# Patient Record
Sex: MALE | Race: WHITE | NOT HISPANIC OR LATINO | Employment: FULL TIME | URBAN - METROPOLITAN AREA
[De-identification: names, ages, dates, MRNs, and addresses within clinical notes are randomized per-mention and may not be internally consistent; named-entity substitution may affect disease eponyms.]

---

## 2022-01-03 ENCOUNTER — HOSPITAL ENCOUNTER (OUTPATIENT)
Facility: HOSPITAL | Age: 67
Setting detail: OBSERVATION
Discharge: HOME | End: 2022-01-04
Attending: EMERGENCY MEDICINE | Admitting: INTERNAL MEDICINE

## 2022-01-03 ENCOUNTER — APPOINTMENT (OUTPATIENT)
Dept: CT IMAGING | Facility: HOSPITAL | Age: 67
End: 2022-01-03

## 2022-01-03 DIAGNOSIS — R06.09 DYSPNEA ON EXERTION: ICD-10-CM

## 2022-01-03 DIAGNOSIS — R00.0 TACHYCARDIA: ICD-10-CM

## 2022-01-03 DIAGNOSIS — R06.02 SHORTNESS OF BREATH: ICD-10-CM

## 2022-01-03 DIAGNOSIS — R55 NEAR SYNCOPE: ICD-10-CM

## 2022-01-03 DIAGNOSIS — I95.9 HYPOTENSION, UNSPECIFIED HYPOTENSION TYPE: Primary | ICD-10-CM

## 2022-01-03 LAB
ALBUMIN SERPL-MCNC: 4.8 G/DL (ref 3.5–5.2)
ALBUMIN/GLOB SERPL: 1.7 G/DL
ALP SERPL-CCNC: 97 U/L (ref 39–117)
ALT SERPL W P-5'-P-CCNC: 17 U/L (ref 1–41)
ANION GAP SERPL CALCULATED.3IONS-SCNC: 14 MMOL/L (ref 5–15)
AST SERPL-CCNC: 22 U/L (ref 1–40)
BASOPHILS # BLD AUTO: 0.05 10*3/MM3 (ref 0–0.2)
BASOPHILS NFR BLD AUTO: 0.7 % (ref 0–1.5)
BILIRUB SERPL-MCNC: 0.9 MG/DL (ref 0–1.2)
BILIRUB UR QL STRIP: NEGATIVE
BUN SERPL-MCNC: 19 MG/DL (ref 8–23)
BUN/CREAT SERPL: 17.9 (ref 7–25)
CALCIUM SPEC-SCNC: 10.1 MG/DL (ref 8.6–10.5)
CHLORIDE SERPL-SCNC: 95 MMOL/L (ref 98–107)
CLARITY UR: CLEAR
CO2 SERPL-SCNC: 23 MMOL/L (ref 22–29)
COLOR UR: YELLOW
CREAT SERPL-MCNC: 1.06 MG/DL (ref 0.76–1.27)
DEPRECATED RDW RBC AUTO: 41.3 FL (ref 37–54)
EOSINOPHIL # BLD AUTO: 0.04 10*3/MM3 (ref 0–0.4)
EOSINOPHIL NFR BLD AUTO: 0.6 % (ref 0.3–6.2)
ERYTHROCYTE [DISTWIDTH] IN BLOOD BY AUTOMATED COUNT: 12.5 % (ref 12.3–15.4)
FLUAV SUBTYP SPEC NAA+PROBE: NOT DETECTED
FLUBV RNA ISLT QL NAA+PROBE: NOT DETECTED
GFR SERPL CREATININE-BSD FRML MDRD: 70 ML/MIN/1.73
GFR SERPL CREATININE-BSD FRML MDRD: 85 ML/MIN/1.73
GLOBULIN UR ELPH-MCNC: 2.8 GM/DL
GLUCOSE SERPL-MCNC: 90 MG/DL (ref 65–99)
GLUCOSE UR STRIP-MCNC: NEGATIVE MG/DL
HCT VFR BLD AUTO: 43.1 % (ref 37.5–51)
HGB BLD-MCNC: 15 G/DL (ref 13–17.7)
HGB UR QL STRIP.AUTO: NEGATIVE
HOLD SPECIMEN: NORMAL
IMM GRANULOCYTES # BLD AUTO: 0.01 10*3/MM3 (ref 0–0.05)
IMM GRANULOCYTES NFR BLD AUTO: 0.1 % (ref 0–0.5)
KETONES UR QL STRIP: ABNORMAL
LEUKOCYTE ESTERASE UR QL STRIP.AUTO: NEGATIVE
LYMPHOCYTES # BLD AUTO: 1.13 10*3/MM3 (ref 0.7–3.1)
LYMPHOCYTES NFR BLD AUTO: 15.6 % (ref 19.6–45.3)
MAGNESIUM SERPL-MCNC: 1.9 MG/DL (ref 1.6–2.4)
MCH RBC QN AUTO: 31.7 PG (ref 26.6–33)
MCHC RBC AUTO-ENTMCNC: 34.8 G/DL (ref 31.5–35.7)
MCV RBC AUTO: 91.1 FL (ref 79–97)
MONOCYTES # BLD AUTO: 0.7 10*3/MM3 (ref 0.1–0.9)
MONOCYTES NFR BLD AUTO: 9.6 % (ref 5–12)
NEUTROPHILS NFR BLD AUTO: 5.33 10*3/MM3 (ref 1.7–7)
NEUTROPHILS NFR BLD AUTO: 73.4 % (ref 42.7–76)
NITRITE UR QL STRIP: NEGATIVE
NRBC BLD AUTO-RTO: 0 /100 WBC (ref 0–0.2)
PH UR STRIP.AUTO: 5.5 [PH] (ref 5–8)
PLATELET # BLD AUTO: 329 10*3/MM3 (ref 140–450)
PMV BLD AUTO: 9.8 FL (ref 6–12)
POTASSIUM SERPL-SCNC: 5.2 MMOL/L (ref 3.5–5.2)
PROT SERPL-MCNC: 7.6 G/DL (ref 6–8.5)
PROT UR QL STRIP: NEGATIVE
QT INTERVAL: 326 MS
QTC INTERVAL: 453 MS
RBC # BLD AUTO: 4.73 10*6/MM3 (ref 4.14–5.8)
SARS-COV-2 RNA PNL SPEC NAA+PROBE: NOT DETECTED
SODIUM SERPL-SCNC: 132 MMOL/L (ref 136–145)
SP GR UR STRIP: 1.05 (ref 1–1.03)
T4 FREE SERPL-MCNC: 1.43 NG/DL (ref 0.93–1.7)
TROPONIN T SERPL-MCNC: <0.01 NG/ML (ref 0–0.03)
TSH SERPL DL<=0.05 MIU/L-ACNC: 2.16 UIU/ML (ref 0.27–4.2)
UROBILINOGEN UR QL STRIP: ABNORMAL
WBC NRBC COR # BLD: 7.26 10*3/MM3 (ref 3.4–10.8)
WHOLE BLOOD HOLD SPECIMEN: NORMAL
WHOLE BLOOD HOLD SPECIMEN: NORMAL

## 2022-01-03 PROCEDURE — 93005 ELECTROCARDIOGRAM TRACING: CPT | Performed by: EMERGENCY MEDICINE

## 2022-01-03 PROCEDURE — 85025 COMPLETE CBC W/AUTO DIFF WBC: CPT | Performed by: EMERGENCY MEDICINE

## 2022-01-03 PROCEDURE — 84443 ASSAY THYROID STIM HORMONE: CPT | Performed by: EMERGENCY MEDICINE

## 2022-01-03 PROCEDURE — G0378 HOSPITAL OBSERVATION PER HR: HCPCS

## 2022-01-03 PROCEDURE — 96372 THER/PROPH/DIAG INJ SC/IM: CPT

## 2022-01-03 PROCEDURE — 99284 EMERGENCY DEPT VISIT MOD MDM: CPT

## 2022-01-03 PROCEDURE — 83735 ASSAY OF MAGNESIUM: CPT | Performed by: EMERGENCY MEDICINE

## 2022-01-03 PROCEDURE — 71275 CT ANGIOGRAPHY CHEST: CPT

## 2022-01-03 PROCEDURE — 87636 SARSCOV2 & INF A&B AMP PRB: CPT | Performed by: EMERGENCY MEDICINE

## 2022-01-03 PROCEDURE — 80053 COMPREHEN METABOLIC PANEL: CPT | Performed by: EMERGENCY MEDICINE

## 2022-01-03 PROCEDURE — 0 IOPAMIDOL PER 1 ML: Performed by: EMERGENCY MEDICINE

## 2022-01-03 PROCEDURE — 74177 CT ABD & PELVIS W/CONTRAST: CPT

## 2022-01-03 PROCEDURE — 84439 ASSAY OF FREE THYROXINE: CPT | Performed by: EMERGENCY MEDICINE

## 2022-01-03 PROCEDURE — 36415 COLL VENOUS BLD VENIPUNCTURE: CPT

## 2022-01-03 PROCEDURE — C9803 HOPD COVID-19 SPEC COLLECT: HCPCS

## 2022-01-03 PROCEDURE — 84484 ASSAY OF TROPONIN QUANT: CPT | Performed by: EMERGENCY MEDICINE

## 2022-01-03 PROCEDURE — 81003 URINALYSIS AUTO W/O SCOPE: CPT | Performed by: EMERGENCY MEDICINE

## 2022-01-03 PROCEDURE — 25010000002 ENOXAPARIN PER 10 MG: Performed by: INTERNAL MEDICINE

## 2022-01-03 PROCEDURE — 99219 PR INITIAL OBSERVATION CARE/DAY 50 MINUTES: CPT | Performed by: INTERNAL MEDICINE

## 2022-01-03 RX ORDER — SODIUM CHLORIDE 0.9 % (FLUSH) 0.9 %
10 SYRINGE (ML) INJECTION AS NEEDED
Status: DISCONTINUED | OUTPATIENT
Start: 2022-01-03 | End: 2022-01-04 | Stop reason: HOSPADM

## 2022-01-03 RX ORDER — SODIUM CHLORIDE 0.9 % (FLUSH) 0.9 %
10 SYRINGE (ML) INJECTION EVERY 12 HOURS SCHEDULED
Status: DISCONTINUED | OUTPATIENT
Start: 2022-01-03 | End: 2022-01-04 | Stop reason: HOSPADM

## 2022-01-03 RX ORDER — ATORVASTATIN CALCIUM 40 MG/1
40 TABLET, FILM COATED ORAL NIGHTLY
COMMUNITY

## 2022-01-03 RX ORDER — POTASSIUM CHLORIDE 750 MG/1
10 TABLET, FILM COATED, EXTENDED RELEASE ORAL DAILY
COMMUNITY
End: 2022-01-03

## 2022-01-03 RX ORDER — TRAMADOL HYDROCHLORIDE 50 MG/1
50 TABLET ORAL 3 TIMES DAILY PRN
Status: DISCONTINUED | OUTPATIENT
Start: 2022-01-03 | End: 2022-01-04 | Stop reason: HOSPADM

## 2022-01-03 RX ORDER — ACETAMINOPHEN 650 MG/1
650 SUPPOSITORY RECTAL EVERY 4 HOURS PRN
Status: DISCONTINUED | OUTPATIENT
Start: 2022-01-03 | End: 2022-01-04 | Stop reason: HOSPADM

## 2022-01-03 RX ORDER — GABAPENTIN 400 MG/1
800 CAPSULE ORAL EVERY 8 HOURS SCHEDULED
Status: DISCONTINUED | OUTPATIENT
Start: 2022-01-03 | End: 2022-01-04 | Stop reason: HOSPADM

## 2022-01-03 RX ORDER — CLOBETASOL PROPIONATE 0.5 MG/G
CREAM TOPICAL EVERY 12 HOURS SCHEDULED
Status: DISCONTINUED | OUTPATIENT
Start: 2022-01-03 | End: 2022-01-04 | Stop reason: HOSPADM

## 2022-01-03 RX ORDER — TRAMADOL HYDROCHLORIDE 50 MG/1
100 TABLET ORAL EVERY 6 HOURS PRN
COMMUNITY

## 2022-01-03 RX ORDER — ACETAMINOPHEN 325 MG/1
650 TABLET ORAL EVERY 4 HOURS PRN
Status: DISCONTINUED | OUTPATIENT
Start: 2022-01-03 | End: 2022-01-04 | Stop reason: HOSPADM

## 2022-01-03 RX ORDER — ACETAMINOPHEN 160 MG/5ML
650 SOLUTION ORAL EVERY 4 HOURS PRN
Status: DISCONTINUED | OUTPATIENT
Start: 2022-01-03 | End: 2022-01-04 | Stop reason: HOSPADM

## 2022-01-03 RX ORDER — POTASSIUM CHLORIDE 750 MG/1
10 TABLET, EXTENDED RELEASE ORAL DAILY
COMMUNITY
End: 2022-01-05

## 2022-01-03 RX ORDER — ATORVASTATIN CALCIUM 40 MG/1
40 TABLET, FILM COATED ORAL NIGHTLY
Status: DISCONTINUED | OUTPATIENT
Start: 2022-01-03 | End: 2022-01-04 | Stop reason: HOSPADM

## 2022-01-03 RX ORDER — OMEPRAZOLE 20 MG/1
20 CAPSULE, DELAYED RELEASE ORAL DAILY
COMMUNITY

## 2022-01-03 RX ORDER — LISINOPRIL AND HYDROCHLOROTHIAZIDE 20; 12.5 MG/1; MG/1
0.5 TABLET ORAL DAILY
COMMUNITY
End: 2022-01-04 | Stop reason: HOSPADM

## 2022-01-03 RX ORDER — PANTOPRAZOLE SODIUM 40 MG/1
40 TABLET, DELAYED RELEASE ORAL EVERY MORNING
Status: DISCONTINUED | OUTPATIENT
Start: 2022-01-04 | End: 2022-01-04 | Stop reason: HOSPADM

## 2022-01-03 RX ORDER — CLOBETASOL PROPIONATE 0.5 MG/G
1 OINTMENT TOPICAL 2 TIMES DAILY
COMMUNITY

## 2022-01-03 RX ORDER — SODIUM CHLORIDE 9 MG/ML
100 INJECTION, SOLUTION INTRAVENOUS CONTINUOUS
Status: ACTIVE | OUTPATIENT
Start: 2022-01-03 | End: 2022-01-04

## 2022-01-03 RX ORDER — GABAPENTIN 800 MG/1
800 TABLET ORAL 3 TIMES DAILY
COMMUNITY

## 2022-01-03 RX ADMIN — SODIUM CHLORIDE 100 ML/HR: 9 INJECTION, SOLUTION INTRAVENOUS at 17:44

## 2022-01-03 RX ADMIN — IOPAMIDOL 100 ML: 755 INJECTION, SOLUTION INTRAVENOUS at 15:43

## 2022-01-03 RX ADMIN — TRAMADOL HYDROCHLORIDE 50 MG: 50 TABLET, FILM COATED ORAL at 21:09

## 2022-01-03 RX ADMIN — SODIUM CHLORIDE, PRESERVATIVE FREE 10 ML: 5 INJECTION INTRAVENOUS at 21:10

## 2022-01-03 RX ADMIN — SODIUM CHLORIDE 1000 ML: 9 INJECTION, SOLUTION INTRAVENOUS at 14:13

## 2022-01-03 RX ADMIN — ENOXAPARIN SODIUM 40 MG: 40 INJECTION SUBCUTANEOUS at 17:44

## 2022-01-03 RX ADMIN — GABAPENTIN 800 MG: 400 CAPSULE ORAL at 21:10

## 2022-01-03 NOTE — PLAN OF CARE
Goal Outcome Evaluation:              Outcome Summary: patient is alert x4, roomair, no c/o at this time, will cont to follow poc

## 2022-01-03 NOTE — H&P
"    [facility] Medicine Services  HISTORY AND PHYSICAL    Patient Name: Agus Stover  : 1955  MRN: [id number]  Primary Care Physician: Provider, No Known  Date of admission: 1/3/2022      Subjective   Subjective     Chief Complaint:  Shortness of breath, low blood pressure    HPI:  Agus Stover is a 66 y.o. male with hypertension, HLD, GERD who presents for evaluation of shortness of breath with exertion and low blood pressure.  He reports being in his usual state of health until 2022 when he notes he was going to walk his dog and almost could not make it home due to profound dyspnea.  Upon returning home he checked his blood pressure and reports it was in the 70s systolic, upon resting it gradually increased back up to normal.  He mentions that while his blood pressure was low his heart rate was \"caitlin high.\" He felt better however the following day (yesterday) he had a similar episode while in the shower which improved.  Today he was so profoundly dyspneic with any exertion he presented to the ED for further evaluation.  He denies fever, chills, any medication changes within the last 6 months.  His only noted difference is that he has been eating less predominantly related to work/lifestyle issues.      COVID Details:    Symptoms:    [x] NONE [] Fever []  Cough [] Shortness of breath [] Change in taste/smell      Review of Systems   Gen- No fevers, chills  CV- No chest pain, palpitations  Resp- No cough, dyspnea  GI- No N/V/D, abd pain    All other systems reviewed and are negative.     Personal History     Past Medical History:   Diagnosis Date   • GERD (gastroesophageal reflux disease)    • Gout    • Hyperlipidemia    • Hypertension        History reviewed. No pertinent surgical history.    Family History: Family history reviewed with the patient, there is no history of arrhythmias, CAD, etc.    Social History:  reports that he has quit smoking. He does not have any " smokeless tobacco history on file.  Social History     Social History Narrative   • Not on file       Medications:  Available home medication information reviewed.  (Not in a hospital admission)      No Known Allergies    Objective   Objective     Vital Signs:   Temp:  [98 °F (36.7 °C)] 98 °F (36.7 °C)  Heart Rate:  [] 106  Resp:  [18] 18  BP: (110-155)/(75-98) 124/76       Physical Exam   Constitutional: Awake, alert, no acute distress, sitting up in ED stretcher  Eyes: PERRL, sclerae anicteric, no conjunctival injection  HENT: NCAT, mucous membranes moist  Neck: Supple, trachea midline  Respiratory: Clear to auscultation bilaterally, nonlabored respirations   Cardiovascular: Regular tachycardia, no murmurs, rubs, or gallops, palpable radial pulses bilaterally  Gastrointestinal: Positive bowel sounds, soft, nontender, nondistended  Musculoskeletal: No bilateral ankle edema, no clubbing or cyanosis to extremities  Psychiatric: Appropriate affect, cooperative  Neurologic: Oriented x 3, strength symmetric in all extremities, speech clear    Result Review:  I have personally reviewed the results from the time of this admission to 1/3/2022 16:50 EST and agree with these findings:  [x]  Laboratory  []  Microbiology  [x]  Radiology  []  EKG/Telemetry   []  Cardiology/Vascular   []  Pathology  []  Old records  []  Other:  Most notable findings include: CTA of the chest pending      LAB RESULTS:      Lab 01/03/22  1412   WBC 7.26   HEMOGLOBIN 15.0   HEMATOCRIT 43.1   PLATELETS 329   NEUTROS ABS 5.33   IMMATURE GRANS (ABS) 0.01   LYMPHS ABS 1.13   MONOS ABS 0.70   EOS ABS 0.04   MCV 91.1         Lab 01/03/22  1412   SODIUM 132*   POTASSIUM 5.2   CHLORIDE 95*   CO2 23.0   ANION GAP 14.0   BUN 19   CREATININE 1.06   GLUCOSE 90   CALCIUM 10.1   MAGNESIUM 1.9   TSH 2.160         Lab 01/03/22  1412   TOTAL PROTEIN 7.6   ALBUMIN 4.80   GLOBULIN 2.8   ALT (SGPT) 17   AST (SGOT) 22   BILIRUBIN 0.9   ALK PHOS 97         Lab  01/03/22  1412   TROPONIN T <0.010                     Microbiology Results (last 10 days)     Procedure Component Value - Date/Time    COVID-19 and FLU A/B PCR - Swab, Nasopharynx [[id number]]  (Normal) Collected: 01/03/22 1502    Lab Status: Final result Specimen: Swab from Nasopharynx Updated: 01/03/22 1551     COVID19 Not Detected     Influenza A PCR Not Detected     Influenza B PCR Not Detected    Narrative:      Fact sheet for providers: https://www.fda.gov/media/028554/download    Fact sheet for patients: https://www.fda.gov/media/130655/download    Test performed by PCR.          No radiology results from the last 24 hrs        Assessment/Plan   Assessment & Plan     Active Hospital Problems    Diagnosis  POA   • **Symptomatic hypotension [I95.9]  Yes   • Tachycardia [R00.0]  Yes   • Dyspnea on exertion [R06.00]  Yes     Summary: This is a 66-year-old male with prior tobacco use, hypertension, hyperlipidemia who presents to the hospital for evaluation of 2 days of hypotension, tachycardia, profound dyspnea on exertion    Assessment/plan    Dyspnea on exertion  Symptomatic hypotension  Resting tachycardia  -Initial troponin undetectable, TSH/T4 appropriate, electrolytes appropriate, COVID-19 testing negative  -He has a log from home with SBP's documented in the 50s to 70s, he reports these being following exertion  -CTA of the chest pending, which is appropriate; if negative will follow up with a.m. cortisol and echocardiography  -Noted he has had less oral intake and his home med list has HCTZ, he adamantly denies being on this medicine, will ask pharmacy to clarify; provide IVF overnight  -Check orthostatic vitals qshift  -If work-up unrevealing he would be appropriate for referral to the heart valve clinic for ischemic work-up and ambulatory monitor    Hypertension  -Now intermittently hypotensive, holding home lisinopril-HCTZ    Hyperlipidemia  -Continue atorvastatin    DVT prophylaxis: Lovenox      CODE  STATUS:    Code Status and Medical Interventions:   Ordered at: 01/03/22 6503     Code Status (Patient has no pulse and is not breathing):    CPR (Attempt to Resuscitate)     Medical Interventions (Patient has pulse or is breathing):    Full Support       Admission Status:  I believe this patient meets OBSERVATION status, however if further evaluation or treatment plans warrant, status may change.  Based upon current information, I predict patient's care encounter to be less than or equal to 2 midnights.      Darrel Rhodes, DO  01/03/22

## 2022-01-03 NOTE — ED PROVIDER NOTES
"Subjective   66-year-old male presents for evaluation of hypotension, dizziness, and shortness of breath.  The patient states that over the weekend he began experiencing intermittent episodes of dizziness.  He states that his symptoms were worse when he was walking.  He had several episodes where he felt like he was going to \"pass out.\"  He states that minimal exertion such as attempting to walk his dog got him extremely winded.  After the episodes, he will check his blood pressure and noted significant episodes of hypotension with blood pressures as low as 59/47.  He recorded his blood pressure multiple times after the episodes this weekend and his hypotension was persistent.  He denies any cough or fever.  No sick contacts.  No known exposures to anyone with COVID-19.  Given the persistent nature of his symptoms, he saw his primary care physician today and was subsequently sent here from the office for evaluation.          Review of Systems   Respiratory: Positive for shortness of breath.    Neurological: Positive for dizziness.   All other systems reviewed and are negative.      No past medical history on file.    No Known Allergies    No past surgical history on file.    No family history on file.    Social History     Socioeconomic History   • Marital status:            Objective   Physical Exam  Vitals and nursing note reviewed.   Constitutional:       General: He is not in acute distress.     Appearance: Normal appearance. He is well-developed. He is not diaphoretic.      Comments: Nontoxic-appearing male   HENT:      Head: Normocephalic and atraumatic.   Neck:      Vascular: No JVD.   Cardiovascular:      Rate and Rhythm: Regular rhythm. Tachycardia present.      Heart sounds: Normal heart sounds. No murmur heard.  No friction rub. No gallop.    Pulmonary:      Effort: Pulmonary effort is normal. No respiratory distress.      Breath sounds: Normal breath sounds. No wheezing or rales.   Abdominal:      " General: Bowel sounds are normal. There is no distension.      Palpations: Abdomen is soft. There is no mass.      Tenderness: There is no abdominal tenderness. There is no guarding.   Musculoskeletal:         General: Normal range of motion.      Cervical back: Normal range of motion.   Skin:     General: Skin is warm and dry.      Coloration: Skin is not pale.      Findings: No erythema or rash.   Neurological:      General: No focal deficit present.      Mental Status: He is alert and oriented to person, place, and time.      Comments: Awake, alert, and oriented x3, clear and fluent speech, no ataxia or dysmetria, neurovascularly intact distally in all fours with bounding distal pulses and normal sensation, 5 out of 5 strength in all fours, no cranial nerve deficits noted with cranial nerves II through XII grossly intact   Psychiatric:         Thought Content: Thought content normal.         Judgment: Judgment normal.         Procedures           ED Course  ED Course as of 01/04/22 0550   Mon Jan 03, 2022   1420 66-year-old male presents for evaluation of shortness of breath and hypotension.  He states that his symptoms started over the weekend.  He states that with any type of exertion he experiences significant dyspnea and hypotension.  As result, he saw his primary care physician today, and he was sent to the emergency department to be evaluated.  On arrival, the patient is nontoxic-appearing.  He is tachycardic but vital signs are otherwise reassuring.  We will obtain labs and imaging, and we will reassess following initial interventions. [DD]   1421 Moderate risk Well's. [DD]   1621 SF Syncope +.  I had a long discussion with the patient regarding inpatient versus outpatient management of his symptoms.  I offered to arrange prompt outpatient follow-up with cardiology for outpatient cardiac monitoring.  However, we both felt that admission for observation was warranted given his persistent hypotensive  episodes over the past several days.  I discussed his case with Dr. Rhodes, and the patient will be admitted under his care for further evaluation and treatment.  The patient is aware/agreeable with the plan at this time. [DD]      ED Course User Index  [DD] Pierre Li MD                                          Recent Results (from the past 24 hour(s))   ECG 12 Lead    Collection Time: 01/03/22  1:56 PM   Result Value Ref Range    QT Interval 326 ms    QTC Interval 453 ms   Comprehensive Metabolic Panel    Collection Time: 01/03/22  2:12 PM    Specimen: Blood   Result Value Ref Range    Glucose 90 65 - 99 mg/dL    BUN 19 8 - 23 mg/dL    Creatinine 1.06 0.76 - 1.27 mg/dL    Sodium 132 (L) 136 - 145 mmol/L    Potassium 5.2 3.5 - 5.2 mmol/L    Chloride 95 (L) 98 - 107 mmol/L    CO2 23.0 22.0 - 29.0 mmol/L    Calcium 10.1 8.6 - 10.5 mg/dL    Total Protein 7.6 6.0 - 8.5 g/dL    Albumin 4.80 3.50 - 5.20 g/dL    ALT (SGPT) 17 1 - 41 U/L    AST (SGOT) 22 1 - 40 U/L    Alkaline Phosphatase 97 39 - 117 U/L    Total Bilirubin 0.9 0.0 - 1.2 mg/dL    eGFR Non African Amer 70 >60 mL/min/1.73    eGFR  African Amer 85 >60 mL/min/1.73    Globulin 2.8 gm/dL    A/G Ratio 1.7 g/dL    BUN/Creatinine Ratio 17.9 7.0 - 25.0    Anion Gap 14.0 5.0 - 15.0 mmol/L   Magnesium    Collection Time: 01/03/22  2:12 PM    Specimen: Blood   Result Value Ref Range    Magnesium 1.9 1.6 - 2.4 mg/dL   Troponin    Collection Time: 01/03/22  2:12 PM    Specimen: Blood   Result Value Ref Range    Troponin T <0.010 0.000 - 0.030 ng/mL   Green Top (Gel)    Collection Time: 01/03/22  2:12 PM   Result Value Ref Range    Extra Tube Hold for add-ons.    Lavender Top    Collection Time: 01/03/22  2:12 PM   Result Value Ref Range    Extra Tube hold for add-on    Gold Top - SST    Collection Time: 01/03/22  2:12 PM   Result Value Ref Range    Extra Tube Hold for add-ons.    Gray Top    Collection Time: 01/03/22  2:12 PM   Result Value Ref Range    Extra  Tube Hold for add-ons.    Light Blue Top    Collection Time: 01/03/22  2:12 PM   Result Value Ref Range    Extra Tube hold for add-on    CBC Auto Differential    Collection Time: 01/03/22  2:12 PM    Specimen: Blood   Result Value Ref Range    WBC 7.26 3.40 - 10.80 10*3/mm3    RBC 4.73 4.14 - 5.80 10*6/mm3    Hemoglobin 15.0 13.0 - 17.7 g/dL    Hematocrit 43.1 37.5 - 51.0 %    MCV 91.1 79.0 - 97.0 fL    MCH 31.7 26.6 - 33.0 pg    MCHC 34.8 31.5 - 35.7 g/dL    RDW 12.5 12.3 - 15.4 %    RDW-SD 41.3 37.0 - 54.0 fl    MPV 9.8 6.0 - 12.0 fL    Platelets 329 140 - 450 10*3/mm3    Neutrophil % 73.4 42.7 - 76.0 %    Lymphocyte % 15.6 (L) 19.6 - 45.3 %    Monocyte % 9.6 5.0 - 12.0 %    Eosinophil % 0.6 0.3 - 6.2 %    Basophil % 0.7 0.0 - 1.5 %    Immature Grans % 0.1 0.0 - 0.5 %    Neutrophils, Absolute 5.33 1.70 - 7.00 10*3/mm3    Lymphocytes, Absolute 1.13 0.70 - 3.10 10*3/mm3    Monocytes, Absolute 0.70 0.10 - 0.90 10*3/mm3    Eosinophils, Absolute 0.04 0.00 - 0.40 10*3/mm3    Basophils, Absolute 0.05 0.00 - 0.20 10*3/mm3    Immature Grans, Absolute 0.01 0.00 - 0.05 10*3/mm3    nRBC 0.0 0.0 - 0.2 /100 WBC   T4, Free    Collection Time: 01/03/22  2:12 PM    Specimen: Blood   Result Value Ref Range    Free T4 1.43 0.93 - 1.70 ng/dL   TSH    Collection Time: 01/03/22  2:12 PM    Specimen: Blood   Result Value Ref Range    TSH 2.160 0.270 - 4.200 uIU/mL   COVID-19 and FLU A/B PCR - Swab, Nasopharynx    Collection Time: 01/03/22  3:02 PM    Specimen: Nasopharynx; Swab   Result Value Ref Range    COVID19 Not Detected Not Detected - Ref. Range    Influenza A PCR Not Detected Not Detected    Influenza B PCR Not Detected Not Detected   Urinalysis With Culture If Indicated - Urine, Clean Catch    Collection Time: 01/03/22  5:42 PM    Specimen: Urine, Clean Catch   Result Value Ref Range    Color, UA Yellow Yellow, Straw    Appearance, UA Clear Clear    pH, UA 5.5 5.0 - 8.0    Specific Gravity, UA 1.048 (H) 1.001 - 1.030    Glucose,  UA Negative Negative    Ketones, UA 15 mg/dL (1+) (A) Negative    Bilirubin, UA Negative Negative    Blood, UA Negative Negative    Protein, UA Negative Negative    Leuk Esterase, UA Negative Negative    Nitrite, UA Negative Negative    Urobilinogen, UA 1.0 E.U./dL 0.2 - 1.0 E.U./dL     Note: In addition to lab results from this visit, the labs listed above may include labs taken at another facility or during a different encounter within the last 24 hours. Please correlate lab times with ED admission and discharge times for further clarification of the services performed during this visit.    CT Angiogram Chest   Final Result       No evidence of pulmonary embolism. No acute intrathoracic findings.   Incidental note of a solid noncalcified 7 mm pulmonary nodule in the   right lower lobe; per Fleischner criteria, CT at 6-12 months is   recommended to assess for stability. There are a few smaller   micronodules in the right lower lobe; the appearance may reflect   granulomatous disease.       No acute abdominopelvic findings.                   This report was finalized on 1/3/2022 4:55 PM by Navjot Hook MD.          CT Abdomen Pelvis With Contrast   Final Result       No evidence of pulmonary embolism. No acute intrathoracic findings.   Incidental note of a solid noncalcified 7 mm pulmonary nodule in the   right lower lobe; per Fleischner criteria, CT at 6-12 months is   recommended to assess for stability. There are a few smaller   micronodules in the right lower lobe; the appearance may reflect   granulomatous disease.       No acute abdominopelvic findings.                   This report was finalized on 1/3/2022 4:55 PM by Navjot Hook MD.            Vitals:    01/03/22 2300 01/04/22 0100 01/04/22 0300 01/04/22 0500   BP: 102/69  115/66    BP Location: Left arm  Left arm    Patient Position: Lying  Lying    Pulse: 104 90 92 92   Resp: 18  18    Temp: 98.7 °F (37.1 °C)  98.8 °F (37.1 °C)    TempSrc: Oral   Oral    SpO2: 98% 97% 99% 100%   Weight:       Height:         Medications   sodium chloride 0.9 % flush 10 mL (has no administration in time range)   atorvastatin (LIPITOR) tablet 40 mg (40 mg Oral Not Given 1/3/22 2110)   clobetasol (TEMOVATE) 0.05 % cream ( Topical Not Given 1/3/22 2110)   gabapentin (NEURONTIN) capsule 800 mg (800 mg Oral Given 1/3/22 2110)   pantoprazole (PROTONIX) EC tablet 40 mg (has no administration in time range)   traMADol (ULTRAM) tablet 50 mg (50 mg Oral Given 1/3/22 2109)   sodium chloride 0.9 % flush 10 mL (10 mL Intravenous Given 1/3/22 2110)   sodium chloride 0.9 % flush 10 mL (has no administration in time range)   enoxaparin (LOVENOX) syringe 40 mg (40 mg Subcutaneous Given 1/3/22 1744)   acetaminophen (TYLENOL) tablet 650 mg (has no administration in time range)     Or   acetaminophen (TYLENOL) 160 MG/5ML solution 650 mg (has no administration in time range)     Or   acetaminophen (TYLENOL) suppository 650 mg (has no administration in time range)   sodium chloride 0.9 % infusion (100 mL/hr Intravenous Currently Infusing 1/4/22 0441)   sodium chloride 0.9 % bolus 1,000 mL (0 mL Intravenous Stopped 1/3/22 1930)   iopamidol (ISOVUE-370) 76 % injection 100 mL (100 mL Intravenous Given 1/3/22 1543)     ECG/EMG Results (last 24 hours)     Procedure Component Value Units Date/Time    ECG 12 Lead [[id number]] Collected: 01/03/22 1356     Updated: 01/03/22 1434     QT Interval 326 ms      QTC Interval 453 ms     Narrative:      Test Reason : Syncope triage protocol  Blood Pressure :   */*   mmHG  Vent. Rate : 116 BPM     Atrial Rate : 116 BPM     P-R Int : 166 ms          QRS Dur :  90 ms      QT Int : 326 ms       P-R-T Axes :  64 -81  56 degrees     QTc Int : 453 ms    Sinus tachycardia  Left anterior fascicular block  Inferior infarct , age undetermined  Possible Anterolateral infarct , age undetermined  Abnormal ECG  No previous ECGs available  Confirmed by MD Jen, Ck ([id number]) on  1/3/2022 2:34:17 PM    Referred By: FABIENNE           Confirmed By: Ck Li MD        ECG 12 Lead   Final Result   Test Reason : Syncope triage protocol   Blood Pressure :   */*   mmHG   Vent. Rate : 116 BPM     Atrial Rate : 116 BPM      P-R Int : 166 ms          QRS Dur :  90 ms       QT Int : 326 ms       P-R-T Axes :  64 -81  56 degrees      QTc Int : 453 ms      Sinus tachycardia   Left anterior fascicular block   Inferior infarct , age undetermined   Possible Anterolateral infarct , age undetermined   Abnormal ECG   No previous ECGs available   Confirmed by MD Li Michael ([id number]) on 1/3/2022 2:34:17 PM      Referred By: FABIENNE           Confirmed By: Ck Li MD                    [facility]    Final diagnoses:   Hypotension, unspecified hypotension type   Shortness of breath   Near syncope       ED Disposition  ED Disposition     ED Disposition Condition Comment    Decision to Admit  Level of Care: Telemetry [5]   Diagnosis: Symptomatic hypotension [[id number]]   Bed Request Comments: Can go to short stay/CDU            No follow-up provider specified.       Medication List      No changes were made to your prescriptions during this visit.          Pierre Li MD  01/04/22 0552

## 2022-01-04 ENCOUNTER — APPOINTMENT (OUTPATIENT)
Dept: CARDIOLOGY | Facility: HOSPITAL | Age: 67
End: 2022-01-04

## 2022-01-04 VITALS
WEIGHT: 200 LBS | HEART RATE: 107 BPM | TEMPERATURE: 98 F | OXYGEN SATURATION: 100 % | BODY MASS INDEX: 28 KG/M2 | SYSTOLIC BLOOD PRESSURE: 101 MMHG | DIASTOLIC BLOOD PRESSURE: 79 MMHG | RESPIRATION RATE: 16 BRPM | HEIGHT: 71 IN

## 2022-01-04 LAB
ANION GAP SERPL CALCULATED.3IONS-SCNC: 12 MMOL/L (ref 5–15)
BH CV ECHO MEAS - BSA(HAYCOCK): 2.1 M^2
BH CV ECHO MEAS - BSA: 2.1 M^2
BH CV ECHO MEAS - BZI_BMI: 27.9 KILOGRAMS/M^2
BH CV ECHO MEAS - BZI_METRIC_HEIGHT: 180.3 CM
BH CV ECHO MEAS - BZI_METRIC_WEIGHT: 90.7 KG
BH CV ECHO MEAS - EDV(CUBED): 91.1 ML
BH CV ECHO MEAS - EDV(MOD-SP2): 74.8 ML
BH CV ECHO MEAS - EDV(MOD-SP4): 82.6 ML
BH CV ECHO MEAS - EDV(TEICH): 92.4 ML
BH CV ECHO MEAS - EF(CUBED): 64.5 %
BH CV ECHO MEAS - EF(MOD-SP2): 58 %
BH CV ECHO MEAS - EF(MOD-SP4): 48.1 %
BH CV ECHO MEAS - EF(TEICH): 56.1 %
BH CV ECHO MEAS - ESV(CUBED): 32.3 ML
BH CV ECHO MEAS - ESV(MOD-SP2): 31.4 ML
BH CV ECHO MEAS - ESV(MOD-SP4): 42.8 ML
BH CV ECHO MEAS - ESV(TEICH): 40.5 ML
BH CV ECHO MEAS - FS: 29.2 %
BH CV ECHO MEAS - IVS/LVPW: 1.3
BH CV ECHO MEAS - IVSD: 0.99 CM
BH CV ECHO MEAS - LA DIMENSION: 3.2 CM
BH CV ECHO MEAS - LV DIASTOLIC VOL/BSA (35-75): 39.2 ML/M^2
BH CV ECHO MEAS - LV MASS(C)D: 130 GRAMS
BH CV ECHO MEAS - LV MASS(C)DI: 61.6 GRAMS/M^2
BH CV ECHO MEAS - LV MAX PG: 5.8 MMHG
BH CV ECHO MEAS - LV MEAN PG: 2.9 MMHG
BH CV ECHO MEAS - LV SYSTOLIC VOL/BSA (12-30): 20.3 ML/M^2
BH CV ECHO MEAS - LV V1 MAX: 120.1 CM/SEC
BH CV ECHO MEAS - LV V1 MEAN: 76.6 CM/SEC
BH CV ECHO MEAS - LV V1 VTI: 33 CM
BH CV ECHO MEAS - LVIDD: 4.5 CM
BH CV ECHO MEAS - LVIDS: 3.2 CM
BH CV ECHO MEAS - LVOT AREA: 4.4 CM^2
BH CV ECHO MEAS - LVOT DIAM: 2.4 CM
BH CV ECHO MEAS - LVPWD: 0.78 CM
BH CV ECHO MEAS - MV A MAX VEL: 120.1 CM/SEC
BH CV ECHO MEAS - MV DEC SLOPE: 281.3 CM/SEC^2
BH CV ECHO MEAS - MV DEC TIME: 0.31 SEC
BH CV ECHO MEAS - MV E MAX VEL: 87.9 CM/SEC
BH CV ECHO MEAS - MV E/A: 0.73
BH CV ECHO MEAS - MV MAX PG: 7.4 MMHG
BH CV ECHO MEAS - MV MEAN PG: 2.9 MMHG
BH CV ECHO MEAS - MV V2 MAX: 135.8 CM/SEC
BH CV ECHO MEAS - MV V2 MEAN: 77.6 CM/SEC
BH CV ECHO MEAS - MV V2 VTI: 28.4 CM
BH CV ECHO MEAS - MVA(VTI): 5.1 CM^2
BH CV ECHO MEAS - PA ACC TIME: 0.1 SEC
BH CV ECHO MEAS - PA PR(ACCEL): 32.8 MMHG
BH CV ECHO MEAS - SI(CUBED): 27.8 ML/M^2
BH CV ECHO MEAS - SI(LVOT): 68.5 ML/M^2
BH CV ECHO MEAS - SI(MOD-SP2): 20.6 ML/M^2
BH CV ECHO MEAS - SI(MOD-SP4): 18.9 ML/M^2
BH CV ECHO MEAS - SI(TEICH): 24.6 ML/M^2
BH CV ECHO MEAS - SV(CUBED): 58.7 ML
BH CV ECHO MEAS - SV(LVOT): 144.5 ML
BH CV ECHO MEAS - SV(MOD-SP2): 43.4 ML
BH CV ECHO MEAS - SV(MOD-SP4): 39.8 ML
BH CV ECHO MEAS - SV(TEICH): 51.9 ML
BH CV ECHO MEAS - TAPSE (>1.6): 2.2 CM
BH CV VAS BP LEFT ARM: NORMAL MMHG
BH CV XLRA - RV BASE: 3.5 CM
BH CV XLRA - RV LENGTH: 5.4 CM
BH CV XLRA - RV MID: 3.4 CM
BH CV XLRA - TDI S': 22 CM/SEC
BUN SERPL-MCNC: 15 MG/DL (ref 8–23)
BUN/CREAT SERPL: 17.4 (ref 7–25)
CALCIUM SPEC-SCNC: 9.7 MG/DL (ref 8.6–10.5)
CHLORIDE SERPL-SCNC: 100 MMOL/L (ref 98–107)
CO2 SERPL-SCNC: 23 MMOL/L (ref 22–29)
CORTIS SERPL-MCNC: 26.29 MCG/DL
CREAT SERPL-MCNC: 0.86 MG/DL (ref 0.76–1.27)
GFR SERPL CREATININE-BSD FRML MDRD: 90 ML/MIN/1.73
GLUCOSE SERPL-MCNC: 108 MG/DL (ref 65–99)
MAXIMAL PREDICTED HEART RATE: 156 BPM
POTASSIUM SERPL-SCNC: 4.7 MMOL/L (ref 3.5–5.2)
SODIUM SERPL-SCNC: 135 MMOL/L (ref 136–145)
STRESS TARGET HR: 133 BPM

## 2022-01-04 PROCEDURE — 80048 BASIC METABOLIC PNL TOTAL CA: CPT | Performed by: INTERNAL MEDICINE

## 2022-01-04 PROCEDURE — 82533 TOTAL CORTISOL: CPT | Performed by: INTERNAL MEDICINE

## 2022-01-04 PROCEDURE — 99217 PR OBSERVATION CARE DISCHARGE MANAGEMENT: CPT | Performed by: INTERNAL MEDICINE

## 2022-01-04 PROCEDURE — G0378 HOSPITAL OBSERVATION PER HR: HCPCS

## 2022-01-04 PROCEDURE — 93306 TTE W/DOPPLER COMPLETE: CPT | Performed by: INTERNAL MEDICINE

## 2022-01-04 PROCEDURE — 93306 TTE W/DOPPLER COMPLETE: CPT

## 2022-01-04 RX ADMIN — GABAPENTIN 800 MG: 400 CAPSULE ORAL at 06:41

## 2022-01-04 RX ADMIN — GABAPENTIN 800 MG: 400 CAPSULE ORAL at 13:33

## 2022-01-04 RX ADMIN — TRAMADOL HYDROCHLORIDE 50 MG: 50 TABLET, FILM COATED ORAL at 06:54

## 2022-01-04 RX ADMIN — PANTOPRAZOLE SODIUM 40 MG: 40 TABLET, DELAYED RELEASE ORAL at 06:41

## 2022-01-04 RX ADMIN — SODIUM CHLORIDE, POTASSIUM CHLORIDE, SODIUM LACTATE AND CALCIUM CHLORIDE 1000 ML: 600; 310; 30; 20 INJECTION, SOLUTION INTRAVENOUS at 13:30

## 2022-01-04 NOTE — DISCHARGE SUMMARY
[facility] Medicine Services  DISCHARGE SUMMARY    Patient Name: Agus Stover  : 1955  MRN: [id number]    Date of Admission: 1/3/2022  1:47 PM  Date of Discharge:  2021  Primary Care Physician: Provider, No Known    Consults     No orders found for last 30 day(s).          Hospital Course     Presenting Problem:   Symptomatic hypotension [I95.9]    Active Hospital Problems    Diagnosis  POA   • **Symptomatic hypotension [I95.9]  Yes   • Tachycardia [R00.0]  Yes   • Dyspnea on exertion [R06.00]  Yes      Resolved Hospital Problems   No resolved problems to display.          This is a 66-year-old male with prior tobacco use, hypertension, hyperlipidemia who presents to the hospital for evaluation of 2 days of hypotension, tachycardia, profound dyspnea on exertion.  Labs are unremarkable.  Troponin is within normal limits.  EKG showed sinus tachycardia.  BMP was unremarkable.  TSH was within normal limits.  Cortisol was within normal limits.  CBC was unremarkable.  Echo showed a normal EF and had 1 impaired relaxation.  He was notable for hypotension and orthostasis.  He was given IV fluids with improvement.  He had no arrhythmias during his hospitalization on telemetry but was notable for some sinus tachycardia he is no longer having orthostatic symptoms and is appropriate for discharge at this time.  Recommend that he hold his hydrochlorothiazide, lisinopril combo pill.  He denies any chest pain.  Make referral to the heart valve clinic.    Discharge Follow Up Recommendations for outpatient labs/diagnostics:  Follow-up with PCP in 1 week  Referral to the heart valve clinic    Day of Discharge     HPI:   He is doing well.  He is denying any chest pain, shortness of breath.  He also is not having any dizziness or symptoms with standing.  He wants to go home.    Review of Systems  General: denies fevers or chills  CV: denies chest pain  Resp: denies shortness of breath  Abd:  denies abd pain, nausea      Vital Signs:   Temp:  [97.6 °F (36.4 °C)-98.9 °F (37.2 °C)] 98 °F (36.7 °C)  Heart Rate:  [] 107  Resp:  [16-18] 16  BP: ()/(64-90) 101/79     Physical Exam:  Constitutional: Awake, alert  HENT: NCAT, mucous membranes moist  Neck: Supple, trachea midline  Respiratory: Clear to auscultation bilaterally, nonlabored respirations   Cardiovascular: RRR, no murmurs, rubs, or gallops, palpable pedal pulses bilaterally  Gastrointestinal: Positive bowel sounds, soft, nontender, nondistended  Musculoskeletal: No bilateral ankle edema, no clubbing or cyanosis to extremities  Psychiatric: Appropriate affect, cooperative  Neurologic: Oriented x 3, no focal neurological deficits  Skin: No rashes      Pertinent  and/or Most Recent Results     LAB RESULTS:      Lab 01/03/22  1412   WBC 7.26   HEMOGLOBIN 15.0   HEMATOCRIT 43.1   PLATELETS 329   NEUTROS ABS 5.33   IMMATURE GRANS (ABS) 0.01   LYMPHS ABS 1.13   MONOS ABS 0.70   EOS ABS 0.04   MCV 91.1         Lab 01/04/22  0754 01/03/22  1412   SODIUM 135* 132*   POTASSIUM 4.7 5.2   CHLORIDE 100 95*   CO2 23.0 23.0   ANION GAP 12.0 14.0   BUN 15 19   CREATININE 0.86 1.06   GLUCOSE 108* 90   CALCIUM 9.7 10.1   MAGNESIUM  --  1.9   TSH  --  2.160         Lab 01/03/22  1412   TOTAL PROTEIN 7.6   ALBUMIN 4.80   GLOBULIN 2.8   ALT (SGPT) 17   AST (SGOT) 22   BILIRUBIN 0.9   ALK PHOS 97         Lab 01/03/22  1412   TROPONIN T <0.010                 Brief Urine Lab Results  (Last result in the past 365 days)      Color   Clarity   Blood   Leuk Est   Nitrite   Protein   CREAT   Urine HCG        01/03/22 1742 Yellow   Clear   Negative   Negative   Negative   Negative               Microbiology Results (last 10 days)     Procedure Component Value - Date/Time    COVID-19 and FLU A/B PCR - Swab, Nasopharynx [[id number]]  (Normal) Collected: 01/03/22 1502    Lab Status: Final result Specimen: Swab from Nasopharynx Updated: 01/03/22 1551     COVID19 Not  Detected     Influenza A PCR Not Detected     Influenza B PCR Not Detected    Narrative:      Fact sheet for providers: https://www.fda.gov/media/480483/download    Fact sheet for patients: https://www.fda.gov/media/474036/download    Test performed by PCR.          Adult Transthoracic Echo Complete W/ Cont if Necessary Per Protocol    Result Date: 1/4/2022  · Left ventricular ejection fraction appears to be greater than 70%. Left ventricular systolic function is hyperdynamic (EF > 70%). · Left ventricular diastolic function is consistent with (grade I) impaired relaxation.      CT Abdomen Pelvis With Contrast    Result Date: 1/3/2022  EXAMINATION: CT ABDOMEN PELVIS W CONTRAST-, CT ANGIOGRAM CHEST-  INDICATION: hypotension, SOB  TECHNIQUE: CTA chest (PE protocol). CT abdomen and pelvis with IV contrast.  COMPARISON: NONE  FINDINGS:  CTA chest:  Normal appearance of the pulmonary arteries without evidence of pulmonary embolism. Unremarkable appearance of the cardiac chambers. Mild coronary artery calcifications are noted. No pericardial effusion. Unremarkable appearance of the thoracic aorta. No bulky or pathologic mediastinal or hilar adenopathy. Unremarkable appearance of the thoracic esophagus. Homogeneous attenuation of the thyroid gland. Normal appearance of the chest wall soft tissues. No axillary lymphadenopathy. The trachea and mainstem bronchi are patent. The smaller peripheral airways are unremarkable. Mild to moderate centrilobular emphysema most conspicuously in the upper lungs. No focal consolidation to suggest acute infectious or inflammatory process. No lung mass. 7 mm solid noncalcified nodule in the right lower lobe (series 6 image 83) with a few additional smaller scattered solid micronodules in the periphery of the right lower lobe. No pleural effusion or pneumothorax. Scattered peripheral reticulations and mild linear scarring in the lung bases. No acute osseous findings in the thorax.  CT abdomen  and pelvis:  Unremarkable appearance of the liver, gallbladder, bile ducts, spleen, pancreas, adrenal glands, kidneys, bladder. Sigmoid colonic diverticulosis without evidence of diverticulitis. Stool and gas is present in the rectum. Normal appendix. No bowel obstruction. Atherosclerosis of the abdominal aorta without aneurysm with otherwise unremarkable appearance of the abdominopelvic vasculature. No free intra-abdominal fluid or conspicuous intra-abdominal fat stranding. No pneumoperitoneum. Unremarkable appearance of the body wall soft tissues. No abdominopelvic adenopathy. No acute osseous findings. Severe osteoarthritic change of the left hip.         No evidence of pulmonary embolism. No acute intrathoracic findings. Incidental note of a solid noncalcified 7 mm pulmonary nodule in the right lower lobe; per Fleischner criteria, CT at 6-12 months is recommended to assess for stability. There are a few smaller micronodules in the right lower lobe; the appearance may reflect granulomatous disease.  No acute abdominopelvic findings.     This report was finalized on 1/3/2022 4:55 PM by Navjot Hook MD.      CT Angiogram Chest    Result Date: 1/3/2022  EXAMINATION: CT ABDOMEN PELVIS W CONTRAST-, CT ANGIOGRAM CHEST-  INDICATION: hypotension, SOB  TECHNIQUE: CTA chest (PE protocol). CT abdomen and pelvis with IV contrast.  COMPARISON: NONE  FINDINGS:  CTA chest:  Normal appearance of the pulmonary arteries without evidence of pulmonary embolism. Unremarkable appearance of the cardiac chambers. Mild coronary artery calcifications are noted. No pericardial effusion. Unremarkable appearance of the thoracic aorta. No bulky or pathologic mediastinal or hilar adenopathy. Unremarkable appearance of the thoracic esophagus. Homogeneous attenuation of the thyroid gland. Normal appearance of the chest wall soft tissues. No axillary lymphadenopathy. The trachea and mainstem bronchi are patent. The smaller peripheral airways  are unremarkable. Mild to moderate centrilobular emphysema most conspicuously in the upper lungs. No focal consolidation to suggest acute infectious or inflammatory process. No lung mass. 7 mm solid noncalcified nodule in the right lower lobe (series 6 image 83) with a few additional smaller scattered solid micronodules in the periphery of the right lower lobe. No pleural effusion or pneumothorax. Scattered peripheral reticulations and mild linear scarring in the lung bases. No acute osseous findings in the thorax.  CT abdomen and pelvis:  Unremarkable appearance of the liver, gallbladder, bile ducts, spleen, pancreas, adrenal glands, kidneys, bladder. Sigmoid colonic diverticulosis without evidence of diverticulitis. Stool and gas is present in the rectum. Normal appendix. No bowel obstruction. Atherosclerosis of the abdominal aorta without aneurysm with otherwise unremarkable appearance of the abdominopelvic vasculature. No free intra-abdominal fluid or conspicuous intra-abdominal fat stranding. No pneumoperitoneum. Unremarkable appearance of the body wall soft tissues. No abdominopelvic adenopathy. No acute osseous findings. Severe osteoarthritic change of the left hip.         No evidence of pulmonary embolism. No acute intrathoracic findings. Incidental note of a solid noncalcified 7 mm pulmonary nodule in the right lower lobe; per Fleischner criteria, CT at 6-12 months is recommended to assess for stability. There are a few smaller micronodules in the right lower lobe; the appearance may reflect granulomatous disease.  No acute abdominopelvic findings.     This report was finalized on 1/3/2022 4:55 PM by Navjot Hook MD.                Results for orders placed during the hospital encounter of 01/03/22    Adult Transthoracic Echo Complete W/ Cont if Necessary Per Protocol    Interpretation Summary  · Left ventricular ejection fraction appears to be greater than 70%. Left ventricular systolic function is  hyperdynamic (EF > 70%).  · Left ventricular diastolic function is consistent with (grade I) impaired relaxation.      Plan for Follow-up of Pending Labs/Results:     Discharge Details        Discharge Medications      Continue These Medications      Instructions Start Date   atorvastatin 40 MG tablet  Commonly known as: LIPITOR   40 mg, Oral, Nightly      clobetasol 0.05 % ointment  Commonly known as: TEMOVATE   1 application, Topical, 2 Times Daily      gabapentin 800 MG tablet  Commonly known as: NEURONTIN   800 mg, Oral, 3 Times Daily      omeprazole 20 MG capsule  Commonly known as: priLOSEC   20 mg, Oral, Daily      potassium chloride 10 MEQ CR tablet  Commonly known as: K-DUR,KLOR-CON   10 mEq, Oral, Daily      traMADol 50 MG tablet  Commonly known as: ULTRAM   50 mg, Oral, 3 Times Daily PRN         Stop These Medications    lisinopril-hydrochlorothiazide 20-12.5 MG per tablet  Commonly known as: PRINZIDE,ZESTORETIC            No Known Allergies      Discharge Disposition:  Home or Self Care    Diet:  Hospital:  Diet Order   Procedures   • Diet Regular       Activity:  Activity Instructions     Activity as Tolerated            Restrictions or Other Recommendations:         CODE STATUS:    Code Status and Medical Interventions:   Ordered at: 01/03/22 1650     Code Status (Patient has no pulse and is not breathing):    CPR (Attempt to Resuscitate)     Medical Interventions (Patient has pulse or is breathing):    Full Support       No future appointments.    Additional Instructions for the Follow-ups that You Need to Schedule     Discharge Follow-up with PCP   As directed       Currently Documented PCP:    Provider, No Known    PCP Phone Number:    None     Follow Up Details: 1-2 week                     Ann Padilla MD  01/04/22      Time Spent on Discharge:  I spent  35  minutes on this discharge activity which included: face-to-face encounter with the patient, reviewing the data in the system,  coordination of the care with the nursing staff as well as consultants, documentation, and entering orders.

## 2022-01-04 NOTE — PLAN OF CARE
Goal Outcome Evaluation:  Plan of Care Reviewed With: patient        Progress: no change  Outcome Summary: Pt has positive orthostatic BP's this shift, has not been symptomatic this shift. No complaints at this time. Will continue plan of care. [id number] 1/4/2022

## 2022-01-04 NOTE — CASE MANAGEMENT/SOCIAL WORK
Discharge Planning Assessment  [facility]     Patient Name: Agus Stover  MRN: [id number]  Today's Date: 1/4/2022    Admit Date: 1/3/2022     Discharge Needs Assessment     Row Name 01/04/22 1345       Living Environment    Lives With alone    Current Living Arrangements home/apartment/condo    Primary Care Provided by self    Family Caregiver if Needed none    Able to Return to Prior Arrangements yes    Living Arrangement Comments plan home       Transition Planning    Patient/Family Anticipates Transition to home    Transportation Anticipated family or friend will provide       Discharge Needs Assessment    Equipment Currently Used at Home none    Concerns to be Addressed no discharge needs identified    Equipment Needed After Discharge none    Discharge Coordination/Progress plan is home               Discharge Plan     Row Name 01/04/22 1348       Plan    Plan home    Patient/Family in Agreement with Plan yes    Plan Comments I talked with patient at bedside and he lives in [facility] alone. Plan is home, independent, still working and his car is in the parking lot. He plans to drive himself home. [place] @ [id number]    Final Discharge Disposition Code 01 - home or self-care              Continued Care and Services - Admitted Since 1/3/2022    Coordination has not been started for this encounter.          Demographic Summary     Row Name 01/04/22 1344       General Information    Referral Source admission list    Preferred Language English     Used During This Interaction no       Contact Information    Permission Granted to Share Info With     Contact Information Obtained for                Functional Status     Row Name 01/04/22 1345       Functional Status    Usual Activity Tolerance good    Current Activity Tolerance moderate       Functional Status, IADL    Medications independent    Meal Preparation independent    Housekeeping independent    Laundry independent    Shopping  independent               Psychosocial    No documentation.                Abuse/Neglect    No documentation.                Legal    No documentation.                Substance Abuse    No documentation.                Patient Forms    No documentation.                   Francisca Camacho RN

## 2022-01-05 ENCOUNTER — OFFICE VISIT (OUTPATIENT)
Dept: CARDIOLOGY | Facility: HOSPITAL | Age: 67
End: 2022-01-05

## 2022-01-05 VITALS
BODY MASS INDEX: 28.88 KG/M2 | TEMPERATURE: 97.5 F | DIASTOLIC BLOOD PRESSURE: 70 MMHG | HEIGHT: 71 IN | SYSTOLIC BLOOD PRESSURE: 99 MMHG | HEART RATE: 86 BPM | WEIGHT: 206.31 LBS | RESPIRATION RATE: 18 BRPM | OXYGEN SATURATION: 98 %

## 2022-01-05 DIAGNOSIS — R94.31 ABNORMAL ECG: ICD-10-CM

## 2022-01-05 DIAGNOSIS — I10 PRIMARY HYPERTENSION: ICD-10-CM

## 2022-01-05 DIAGNOSIS — E78.5 HYPERLIPIDEMIA, UNSPECIFIED HYPERLIPIDEMIA TYPE: ICD-10-CM

## 2022-01-05 DIAGNOSIS — R06.09 DOE (DYSPNEA ON EXERTION): Primary | ICD-10-CM

## 2022-01-05 DIAGNOSIS — I95.9 SYMPTOMATIC HYPOTENSION: ICD-10-CM

## 2022-01-05 PROCEDURE — 99204 OFFICE O/P NEW MOD 45 MIN: CPT | Performed by: NURSE PRACTITIONER

## 2022-01-05 NOTE — PROGRESS NOTES
Chief Complaint  Rapid Heart Rate and Shortness of Breath    Subjective    History of Present Illness {CC  Problem List  Visit  Diagnosis   Encounters  Notes  Medications  Labs  Result Review Imaging  Media :23}     Agus Stover, 66 y.o. male with prior tobacco use, hypertension, hyperlipidemia presents to [facility] and Valve clinic for Rapid Heart Rate and Shortness of Breath.     Patient recently hospitalized at [facility] from 1/3/2022-1/4/2022 for evaluation of hypotension, tachycardia, profound dyspnea on exertion.  Work-up included overall unremarkable lab results including troponin, BMP, TSH, cortisol, CBC.  Patient was found to have sinus tachycardia/left anterior fascicular block on ECG.  Echocardiogram revealed LVEF 70%, grade 1 diastolic dysfunction, no significant valvular abnormalities. He was notable for hypotension and orthostasis; he was given IV fluids with improvement.  He had no arrhythmias during his hospitalization on telemetry but was notable for some sinus tachycardia during hospitalization.  He was instructed to hold HCTZ-lisinopril at discharge.    Patient presents today with improvement in dyspnea on exertion symptoms and improved blood pressure since discharge. He states that he initially developed hypotension with dyspnea/SCHNEIDER when walking up stairs at his home and noted to be hypotensive with SBP as low as 59 when he checked his blood pressure. He was feeling better later that day and went for a walk with his dog with return of his SCHNEIDER symptoms. He noted that his blood pressure was once again hypotensive in the 70s with heart rate elevated. Heart rate was generally 80-90, and SBP eventually improved to 102/54 prior to emergency department evaluation.  He denies chest pain, palpitation/arrhythmia symptoms, heart racing, and syncope during above episodes.    He reports he previously made dietary changes with a 7lb weight loss the week prior to  "hypotensive/SCHNEIDER episodes and hospitalization.  He also reports poor fluid intake; 1 to 2 glasses of water and approximately 5 sodas per day. Since discharge he has had no recurrence of hypotension or dyspnea on exertion; SBP at home generally ~118 and heart rate .  He denies chest pain, dyspnea, palpitation, racing heart, and syncope.      Objective     Vital Signs:   Vitals:    01/05/22 1307 01/05/22 1308 01/05/22 1309   BP: 117/75 109/77 99/70   BP Location: Right arm Left arm Left arm   Patient Position: Sitting Sitting Sitting   Cuff Size: Adult Adult Adult   Pulse: 89 99 86   Resp:   18   Temp: 97.3 °F (36.3 °C) 97.5 °F (36.4 °C) 97.5 °F (36.4 °C)   TempSrc: Temporal Temporal Temporal   SpO2: 97% 99% 98%   Weight:   93.6 kg (206 lb 5 oz)   Height:   180.3 cm (71\")     Body mass index is 28.77 kg/m².  Physical Exam  Vitals and nursing note reviewed.   Constitutional:       Appearance: Normal appearance.   HENT:      Head: Normocephalic.   Eyes:      Extraocular Movements: Extraocular movements intact.   Neck:      Vascular: No carotid bruit.   Cardiovascular:      Rate and Rhythm: Normal rate and regular rhythm.      Pulses: Normal pulses.      Heart sounds: Normal heart sounds, S1 normal and S2 normal. No murmur heard.      Pulmonary:      Effort: Pulmonary effort is normal. No respiratory distress.      Breath sounds: Normal breath sounds.   Musculoskeletal:      Cervical back: Neck supple.      Right lower leg: No edema.      Left lower leg: No edema.   Skin:     General: Skin is warm and dry.   Neurological:      General: No focal deficit present.      Mental Status: He is alert.   Psychiatric:         Mood and Affect: Mood normal.         Behavior: Behavior normal.         Thought Content: Thought content normal.        Data Reviewed:{ Labs  Result Review  Imaging  Med Tab  Media :23}     Adult Transthoracic Echo Complete W/ Cont if Necessary Per Protocol (01/04/2022 09:06)  ECG 12 Lead " (01/03/2022 13:56)  Basic Metabolic Panel (01/04/2022 07:54)  Cortisol (01/04/2022 07:54)  Urinalysis With Culture If Indicated - Urine, Clean Catch (01/03/2022 17:42)  COVID-19 and FLU A/B PCR - Swab, Nasopharynx (01/03/2022 15:02)  TSH (01/03/2022 14:12)  T4, Free (01/03/2022 14:12)  Troponin (01/03/2022 14:12)  CBC & Differential (01/03/2022 14:12)      Assessment and Plan {CC Problem List  Visit Diagnosis  ROS  Review (Popup)  Koko Maintenance  Quality  BestPractice  Medications  SmartSets  SnapShot Encounters  Media :23}     1. SCHNEIDER (dyspnea on exertion)  -Symptoms present during hypotensive episodes that prompted hospitalization.  Given his SCHNEIDER symptoms with hypotension and no recent cardiac evaluation will complete myocardial perfusion test to rule out ischemic etiology.  -Now resolved, no recurrence since hospitalization.  -TTE with preserved LVEF, no significant valvular abnormalities.  Chest CT with no evidence of PE.  -No arrhythmias during hospitalization  -Stress Test With Myocardial Perfusion One Day; Future    2. Symptomatic hypotension  -Hypotension with SCHNEIDER during low SBP 59 reported prior to hospitalization.  Symptoms have now resolved.  Patient did receive IV fluids during hospitalization after he was found to be positive for orthostatic hypotension.  Lisinopril-HCTZ discontinued at hospital discharge.  -Home SBP generally ~118 and heart rate  since hospitalization  -Likely related to poor hydration and dietary changes prior to hospitalization.  -Instructed to discontinue lisinopril - HCTZ given previous hypotension and blood pressure is now well controlled.  Instructed to contact heart valve clinic/PCP if SBP consistently greater than 140 at home, would recommend restarting low-dose lisinopril only given his orthostatic hypotension during hospitalization.    3. Primary hypertension  -Well-controlled since discharge  -Discontinue lisinopril-HCTZ as above  - Stress Test With  Myocardial Perfusion One Day; Future    4. Abnormal ECG  -ECG with left anterior fascicular block.  Denies chest pain.  Previous SCHNEIDER during hypotensive episodes of prompted hospital evaluation.  Given his ECG and SCHNEIDER with hypotension will pursue myocardial perfusion study.  - Stress Test With Myocardial Perfusion One Day; Future    5. Hyperlipidemia, unspecified hyperlipidemia type  -Continue atorvastatin 40 mg nightly as prescribed      Follow Up {Instructions Charge Capture  Follow-up Communications :23}     Return if symptoms worsen or fail to improve.      Patient was given instructions and counseling regarding his condition or for health maintenance advice. Please see specific information pulled into the AVS if appropriate.  Patient was instructed to call the [facility] with any questions, concerns, or worsening symptoms.    Dictated Utilizing Dragon Dictation   Please note that portions of this note were completed with a voice recognition program.   Part of this note may be an electronic transcription/translation of spoken language to printed text using the Dragon Dictation System.

## 2022-01-05 NOTE — PATIENT INSTRUCTIONS
- Office will call to schedule testing    - Office will call or send message with testing results    - Increase water to 3L per day    - Call if blood pressure stays consistently above 140 (systolic/top number)

## 2022-01-10 ENCOUNTER — HOSPITAL ENCOUNTER (OUTPATIENT)
Dept: CARDIOLOGY | Facility: HOSPITAL | Age: 67
Discharge: HOME | End: 2022-01-10
Admitting: NURSE PRACTITIONER

## 2022-01-10 VITALS
HEIGHT: 71 IN | HEART RATE: 82 BPM | BODY MASS INDEX: 28.89 KG/M2 | DIASTOLIC BLOOD PRESSURE: 90 MMHG | WEIGHT: 206.35 LBS | SYSTOLIC BLOOD PRESSURE: 144 MMHG

## 2022-01-10 DIAGNOSIS — I10 PRIMARY HYPERTENSION: ICD-10-CM

## 2022-01-10 DIAGNOSIS — R06.09 DOE (DYSPNEA ON EXERTION): ICD-10-CM

## 2022-01-10 DIAGNOSIS — R94.31 ABNORMAL ECG: ICD-10-CM

## 2022-01-10 LAB
BH CV NUCLEAR PRIOR STUDY: 3
BH CV REST NUCLEAR ISOTOPE DOSE: 9.8 MCI
BH CV STRESS BP STAGE 1: NORMAL
BH CV STRESS BP STAGE 2: NORMAL
BH CV STRESS DURATION MIN STAGE 1: 3
BH CV STRESS DURATION MIN STAGE 2: 3
BH CV STRESS DURATION SEC STAGE 1: 0
BH CV STRESS DURATION SEC STAGE 2: 0
BH CV STRESS GRADE STAGE 1: 10
BH CV STRESS GRADE STAGE 2: 12
BH CV STRESS HR STAGE 1: 139
BH CV STRESS HR STAGE 2: 160
BH CV STRESS METS STAGE 1: 5
BH CV STRESS METS STAGE 2: 7.5
BH CV STRESS NUCLEAR ISOTOPE DOSE: 32.6 MCI
BH CV STRESS O2 STAGE 1: 95
BH CV STRESS O2 STAGE 2: 93
BH CV STRESS PROTOCOL 1: NORMAL
BH CV STRESS RECOVERY BP: NORMAL MMHG
BH CV STRESS RECOVERY HR: 103 BPM
BH CV STRESS RECOVERY O2: 100 %
BH CV STRESS SPEED STAGE 1: 1.7
BH CV STRESS SPEED STAGE 2: 2.5
BH CV STRESS STAGE 1: 1
BH CV STRESS STAGE 2: 2
LV EF NUC BP: 81 %
MAXIMAL PREDICTED HEART RATE: 156 BPM
PERCENT MAX PREDICTED HR: 102.56 %
STRESS BASELINE BP: NORMAL MMHG
STRESS BASELINE HR: 75 BPM
STRESS O2 SAT REST: 98 %
STRESS PERCENT HR: 121 %
STRESS POST ESTIMATED WORKLOAD: 1 METS
STRESS POST EXERCISE DUR MIN: 6 MIN
STRESS POST EXERCISE DUR SEC: 0 SEC
STRESS POST O2 SAT PEAK: 93 %
STRESS POST PEAK BP: NORMAL MMHG
STRESS POST PEAK HR: 160 BPM
STRESS TARGET HR: 133 BPM

## 2022-01-10 PROCEDURE — A9500 TC99M SESTAMIBI: HCPCS | Performed by: NURSE PRACTITIONER

## 2022-01-10 PROCEDURE — 0 TECHNETIUM SESTAMIBI: Performed by: NURSE PRACTITIONER

## 2022-01-10 PROCEDURE — 93017 CV STRESS TEST TRACING ONLY: CPT

## 2022-01-10 PROCEDURE — 78452 HT MUSCLE IMAGE SPECT MULT: CPT | Performed by: INTERNAL MEDICINE

## 2022-01-10 PROCEDURE — 78452 HT MUSCLE IMAGE SPECT MULT: CPT

## 2022-01-10 PROCEDURE — 93018 CV STRESS TEST I&R ONLY: CPT | Performed by: INTERNAL MEDICINE

## 2022-01-10 RX ADMIN — TECHNETIUM TC 99M SESTAMIBI 1 DOSE: 1 INJECTION INTRAVENOUS at 08:15

## 2022-01-10 RX ADMIN — TECHNETIUM TC 99M SESTAMIBI 1 DOSE: 1 INJECTION INTRAVENOUS at 10:40
